# Patient Record
Sex: MALE | Race: WHITE | Employment: FULL TIME | URBAN - METROPOLITAN AREA
[De-identification: names, ages, dates, MRNs, and addresses within clinical notes are randomized per-mention and may not be internally consistent; named-entity substitution may affect disease eponyms.]

---

## 2018-01-09 PROBLEM — F90.2 ATTENTION DEFICIT HYPERACTIVITY DISORDER (ADHD), COMBINED TYPE: Status: ACTIVE | Noted: 2018-01-09

## 2019-09-22 ENCOUNTER — HOSPITAL ENCOUNTER (OUTPATIENT)
Age: 29
Discharge: HOME | End: 2019-09-22
Attending: EMERGENCY MEDICINE
Payer: COMMERCIAL

## 2019-09-22 ENCOUNTER — APPOINTMENT (OUTPATIENT)
Dept: GENERAL RADIOLOGY | Age: 29
End: 2019-09-22
Attending: NURSE PRACTITIONER
Payer: COMMERCIAL

## 2019-09-22 VITALS
RESPIRATION RATE: 16 BRPM | HEIGHT: 69 IN | OXYGEN SATURATION: 97 % | BODY MASS INDEX: 27.4 KG/M2 | WEIGHT: 185 LBS | SYSTOLIC BLOOD PRESSURE: 123 MMHG | TEMPERATURE: 98 F | DIASTOLIC BLOOD PRESSURE: 64 MMHG | HEART RATE: 68 BPM

## 2019-09-22 DIAGNOSIS — M79.89 SWELLING OF RIGHT MIDDLE FINGER: Primary | ICD-10-CM

## 2019-09-22 LAB
#MXD IC: 0.9 X10ˆ3/UL (ref 0.1–1)
CREAT BLD-MCNC: 1.1 MG/DL (ref 0.7–1.3)
GLUCOSE BLD-MCNC: 70 MG/DL (ref 70–99)
HCT VFR BLD AUTO: 40 % (ref 39–53)
HGB BLD-MCNC: 14.5 G/DL (ref 13–17.5)
ISTAT BUN: 17 MG/DL (ref 8–20)
ISTAT CHLORIDE: 105 MMOL/L (ref 101–111)
ISTAT HEMATOCRIT: 40 % (ref 37–53)
ISTAT IONIZED CALCIUM FOR CHEM 8: 1.2 MMOL/L (ref 1.12–1.32)
ISTAT POTASSIUM: 4.5 MMOL/L (ref 3.6–5.1)
ISTAT SODIUM: 139 MMOL/L (ref 136–145)
LYMPHOCYTES # BLD AUTO: 1.5 X10ˆ3/UL (ref 1–4)
LYMPHOCYTES NFR BLD AUTO: 19.4 %
MCH RBC QN AUTO: 30.7 PG (ref 26–34)
MCHC RBC AUTO-ENTMCNC: 36.3 G/DL (ref 31–37)
MCV RBC AUTO: 84.7 FL (ref 80–100)
MIXED CELL %: 11.2 %
NEUTROPHILS # BLD AUTO: 5.3 X10ˆ3/UL (ref 1.5–7.7)
NEUTROPHILS NFR BLD AUTO: 69.4 %
PLATELET # BLD AUTO: 356 X10ˆ3/UL (ref 150–450)
RBC # BLD AUTO: 4.72 X10ˆ6/UL (ref 4.3–5.7)
WBC # BLD AUTO: 7.7 X10ˆ3/UL (ref 4–11)

## 2019-09-22 PROCEDURE — 99205 OFFICE O/P NEW HI 60 MIN: CPT

## 2019-09-22 PROCEDURE — 99204 OFFICE O/P NEW MOD 45 MIN: CPT

## 2019-09-22 PROCEDURE — 73140 X-RAY EXAM OF FINGER(S): CPT | Performed by: NURSE PRACTITIONER

## 2019-09-22 PROCEDURE — 96374 THER/PROPH/DIAG INJ IV PUSH: CPT

## 2019-09-22 PROCEDURE — 85025 COMPLETE CBC W/AUTO DIFF WBC: CPT | Performed by: EMERGENCY MEDICINE

## 2019-09-22 PROCEDURE — 29130 APPL FINGER SPLINT STATIC: CPT

## 2019-09-22 PROCEDURE — 80047 BASIC METABLC PNL IONIZED CA: CPT

## 2019-09-22 RX ORDER — HYDROCODONE BITARTRATE AND ACETAMINOPHEN 5; 325 MG/1; MG/1
1-2 TABLET ORAL EVERY 6 HOURS PRN
Qty: 8 TABLET | Refills: 0 | Status: SHIPPED | OUTPATIENT
Start: 2019-09-22 | End: 2019-09-23 | Stop reason: ALTCHOICE

## 2019-09-22 RX ORDER — HYDROCODONE BITARTRATE AND ACETAMINOPHEN 5; 325 MG/1; MG/1
2 TABLET ORAL ONCE
Status: COMPLETED | OUTPATIENT
Start: 2019-09-22 | End: 2019-09-22

## 2019-09-22 RX ORDER — CEPHALEXIN 500 MG/1
500 CAPSULE ORAL 4 TIMES DAILY
Qty: 40 CAPSULE | Refills: 0 | Status: SHIPPED | OUTPATIENT
Start: 2019-09-22 | End: 2019-10-02

## 2019-09-22 RX ORDER — CEFAZOLIN SODIUM 1 G/3ML
2 INJECTION, POWDER, FOR SOLUTION INTRAMUSCULAR; INTRAVENOUS ONCE
Status: COMPLETED | OUTPATIENT
Start: 2019-09-22 | End: 2019-09-22

## 2019-09-22 NOTE — ED INITIAL ASSESSMENT (HPI)
Right 3rd finger swelling that started 4 days  Patient states took a needle and poked his skin to relief pressure  Redness noted  Denies injury

## 2019-09-22 NOTE — ED PROVIDER NOTES
Patient Seen in: [facility]      History   Patient presents with:  Finger Pain    Stated Complaint: finger injury    HPI  Patient is a 79-year-old male with past medical history of partial right third finger tip amputation with Physical Exam   Constitutional: He is oriented to person, place, and time. He appears well-developed and well-nourished. No distress.    Eyes: Conjunctivae are normal.   Pulmonary/Chest: Effort normal.   Musculoskeletal:   Right Hand Exam:  No eryth Dictated by: Gayle Sandy MD on 9/22/2019 at 12:45     Approved by: Gayle Snady MD              [facility]           Right hand x-ray-no acute disease  CBC-unremarkable. No leukocytosis.   Istat-within normal range    IV line was started and labs were obtai

## 2019-09-22 NOTE — ED PROVIDER NOTES
I reviewed that chart and discussed the case. I have examined the patient and noted right upper extremity, mild swelling of third digit from the MCP to fingertip. Patient has several puncture wounds to fat pad tip of the finger.   No erythema or fluctuanc

## 2019-09-25 PROBLEM — L08.9 FINGER INFECTION: Status: ACTIVE | Noted: 2019-09-25

## 2019-09-25 PROBLEM — M65.9 TENOSYNOVITIS: Status: ACTIVE | Noted: 2019-09-25
